# Patient Record
Sex: MALE | Race: WHITE | NOT HISPANIC OR LATINO | URBAN - METROPOLITAN AREA
[De-identification: names, ages, dates, MRNs, and addresses within clinical notes are randomized per-mention and may not be internally consistent; named-entity substitution may affect disease eponyms.]

---

## 2019-07-17 ENCOUNTER — OUTPATIENT (OUTPATIENT)
Dept: OUTPATIENT SERVICES | Facility: HOSPITAL | Age: 54
LOS: 1 days | End: 2019-07-17
Payer: COMMERCIAL

## 2019-07-17 DIAGNOSIS — R94.30 ABNORMAL RESULT OF CARDIOVASCULAR FUNCTION STUDY, UNSPECIFIED: ICD-10-CM

## 2019-07-17 PROCEDURE — 93320 DOPPLER ECHO COMPLETE: CPT | Mod: 26

## 2019-07-17 PROCEDURE — 93016 CV STRESS TEST SUPVJ ONLY: CPT

## 2019-07-17 PROCEDURE — 93325 DOPPLER ECHO COLOR FLOW MAPG: CPT | Mod: 26

## 2019-07-17 PROCEDURE — 93018 CV STRESS TEST I&R ONLY: CPT

## 2019-07-17 PROCEDURE — 93351 STRESS TTE COMPLETE: CPT

## 2019-07-17 PROCEDURE — 93350 STRESS TTE ONLY: CPT | Mod: 26

## 2022-01-14 ENCOUNTER — OUTPATIENT (OUTPATIENT)
Dept: OUTPATIENT SERVICES | Facility: HOSPITAL | Age: 57
LOS: 1 days | End: 2022-01-14
Payer: COMMERCIAL

## 2022-01-14 DIAGNOSIS — R07.9 CHEST PAIN, UNSPECIFIED: ICD-10-CM

## 2022-01-14 DIAGNOSIS — I25.10 ATHEROSCLEROTIC HEART DISEASE OF NATIVE CORONARY ARTERY WITHOUT ANGINA PECTORIS: ICD-10-CM

## 2022-01-14 PROCEDURE — 93351 STRESS TTE COMPLETE: CPT

## 2022-01-14 PROCEDURE — 93351 STRESS TTE COMPLETE: CPT | Mod: 26,52

## 2023-06-23 ENCOUNTER — OUTPATIENT (OUTPATIENT)
Dept: OUTPATIENT SERVICES | Facility: HOSPITAL | Age: 58
LOS: 1 days | End: 2023-06-23
Payer: COMMERCIAL

## 2023-06-23 DIAGNOSIS — R07.9 CHEST PAIN, UNSPECIFIED: ICD-10-CM

## 2023-06-23 DIAGNOSIS — I25.10 ATHEROSCLEROTIC HEART DISEASE OF NATIVE CORONARY ARTERY WITHOUT ANGINA PECTORIS: ICD-10-CM

## 2023-06-23 PROCEDURE — 93351 STRESS TTE COMPLETE: CPT

## 2023-06-23 PROCEDURE — 93351 STRESS TTE COMPLETE: CPT | Mod: 26,52

## 2023-06-23 NOTE — CHART NOTE - NSCHARTNOTEFT_GEN_A_CORE
Mr Hector Clifford presented today for an exercise Stress Echocardiogram. In the past, he has had 2 stress tests. Prior to the initiation of the test, he was explained the protocol as well as risks/benefits of the test. He was strictly instructed to hold on to the treadmill railing in the front to avoid falling. 2 minutes into his treadmill test, he removed his hands off the railing in an effort to "adjust his belt" and fell on his bilateral knees. The test was terminated immediately. Pt did not hit his head. His vitals remained stable with a /70. After aborting the test, he was persistent on getting back on the treadmill. We spoke in depth about the risks associated with continuing this test. His primary Cardiologist was alerted as well. His exam after his fall showed scrapes on bilateral knees. He was able to bear weight and walk without any difficulties. No other positive findings noted on exam.